# Patient Record
Sex: FEMALE | Race: WHITE
[De-identification: names, ages, dates, MRNs, and addresses within clinical notes are randomized per-mention and may not be internally consistent; named-entity substitution may affect disease eponyms.]

---

## 2019-01-24 ENCOUNTER — HOSPITAL ENCOUNTER (OUTPATIENT)
Dept: HOSPITAL 91 - OBT | Age: 44
Discharge: HOME | End: 2019-01-24
Payer: MEDICAID

## 2019-01-24 ENCOUNTER — HOSPITAL ENCOUNTER (OUTPATIENT)
Dept: HOSPITAL 10 - OBT | Age: 44
Discharge: HOME | End: 2019-01-24
Attending: OBSTETRICS & GYNECOLOGY
Payer: MEDICAID

## 2019-01-24 VITALS
WEIGHT: 158.51 LBS | BODY MASS INDEX: 29.93 KG/M2 | HEIGHT: 61 IN | HEIGHT: 61 IN | BODY MASS INDEX: 29.93 KG/M2 | WEIGHT: 158.51 LBS

## 2019-01-24 VITALS — DIASTOLIC BLOOD PRESSURE: 70 MMHG | SYSTOLIC BLOOD PRESSURE: 118 MMHG | HEART RATE: 64 BPM | RESPIRATION RATE: 18 BRPM

## 2019-01-24 DIAGNOSIS — Z3A.36: ICD-10-CM

## 2019-01-24 DIAGNOSIS — O24.410: Primary | ICD-10-CM

## 2019-01-24 DIAGNOSIS — O09.523: ICD-10-CM

## 2019-01-24 LAB
ADD UMIC: NO
UR ASCORBIC ACID: NEGATIVE MG/DL
UR BILIRUBIN (DIP): NEGATIVE MG/DL
UR BLOOD (DIP): NEGATIVE MG/DL
UR CLARITY: CLEAR
UR COLOR: YELLOW
UR GLUCOSE (DIP): NEGATIVE MG/DL
UR KETONES (DIP): NEGATIVE MG/DL
UR LEUKOCYTE ESTERASE (DIP): NEGATIVE LEU/UL
UR NITRITE (DIP): NEGATIVE MG/DL
UR PH (DIP): 6 (ref 5–9)
UR SPECIFIC GRAVITY (DIP): 1 (ref 1–1.03)
UR TOTAL PROTEIN (DIP): NEGATIVE MG/DL
UR UROBILINOGEN (DIP): NEGATIVE MG/DL

## 2019-01-24 PROCEDURE — 76818 FETAL BIOPHYS PROFILE W/NST: CPT

## 2019-01-24 PROCEDURE — G0463 HOSPITAL OUTPT CLINIC VISIT: HCPCS

## 2019-01-24 PROCEDURE — 82962 GLUCOSE BLOOD TEST: CPT

## 2019-01-24 PROCEDURE — 81003 URINALYSIS AUTO W/O SCOPE: CPT

## 2019-01-24 NOTE — PN
Triage Information


Date/Time





2019


Reason for visit:  Patient was sent from clinic for  testing due to GDM


Weeks of Gestation


36 weeks and 2 days


/Para


 6 para 4


Diabetes:  none


Diabetes management:  diet controlled


Hypertention:  none


Additional information


43-year-old  with IUP at 36 weeks and 2 days and GDM A1 diet-controlled was


sent to triage for  testing.  Patient denies any symptoms.  Denies 


leaking vaginal bleeding or contractions or decreased fetal movement.


Denies any urinary symptoms.





Objective





Vital Signs


  Date      Temp  Pulse  Resp  B/P (MAP)   Pulse Ox  O2          O2 Flow    FiO2


Time                                                 Delivery    Rate


   19  98.4     64    18      118/70            Room Air


     14:24                           (86)





Fetal Heart Rate:  130's


Fetal Heart Rate Comments


Category 1


Contractions:  6-10 Minutes Apart


Exam


                                        


PROCEDURE:   US Obstetrical, limited


 


CLINICAL INDICATION: PTL, cervical length


 


TECHNIQUE:   Multiple real-time  images were acquired of the patient's maternal 


abdomen utilizing a curved array transducer. 


 


Appearance: Alert and oriented x4 does not appear to be in any acute distress


Size consent with date


Abdomen soft, gravid


NST: Category 1


BPP: 8/8


Contraction noted 6-10 minutes but patient has not been feeling





Results/Medications


Results 24 hrs





Laboratory Tests


             Test
                     19
15:59  19
16:09


             Urine Color               YELLOW


             Urine Clarity             CLEAR


             Urine pH                          6.0


             Urine Specific Gravity          1.005


             Urine Ketones             NEGATIVE


             Urine Nitrite             NEGATIVE


             Urine Bilirubin           NEGATIVE


             Urine Urobilinogen        NEGATIVE


             Urine Leukocyte Esterase  NEGATIVE


             Urine Hemoglobin          NEGATIVE


             Urine Glucose             NEGATIVE


             Urine Total Protein       NEGATIVE


             Bedside Glucose                                  108





Imaging Results


PROCEDURE:   US OB. 


 


CLINICAL INDICATION:   Contractions 


 


TECHNIQUE:   Multiple sonographic images of the pelvis were obtained.  The 


images were reviewed on a PACS workstation. 


 


COMPARISON:   No prior studies are available for comparison. 


 


FINDINGS:


There is a single live intrauterine pregnancy.  Fetal cardiac activity is 


identified at a rate of 150 beats per minute.


Fetal presentation is cephalic. Placenta is anterior grade II. There is no e


vidence of placenta previa or abruption.


 


Biophysical profile score is as follows:


 


Breathing    2


Movements    2


Tone       2


Fluid volume    2


Amniotic fluid index = 12.1 cm


Total biophysical profile score = 8/8


 


IMPRESSION:


 


Biophysical profile score = 8/8


 


 


RPTAT: 


Disposition:  Discharge


Assessment/Plan


IUP at 36 weeks and 2 days


GDM A1 diet controlled


 testing reassuring





Continue  testing twice a week as recommended by her primary OB office


No evidence of  labor


 labor precautions and kick count discussed


Follow-up with GDM diet


Return to OB office within 48 hours after discharge from the hospital for 


arrangement for  testing discussed


Return to triage as needed











KASSI POLLOCK MD              2019 18:15

## 2019-01-24 NOTE — TRIAGE
===================================

OB Triage

===================================

Datetime Report Generated by CPN: 01/24/2019 17:01

   

   

===========================

Datetime: 01/24/2019 16:09

===========================

   

 Bedside Blood Glucose:  108

   

===========================

Datetime: 01/24/2019 15:31

===========================

   

   

===================================

Labor Evaluation

===================================

   

 Frequency:  x6

 Monitor Mode:  External

 Duration (sec)2399:  50-80

 Quality:  Mild

 Resting Tone [place]:  Relaxed

 Contraction Comments:  pt denies feeling UCs

   

===================================

Fetal Heart Rate

===================================

   

 FHR Baseline Rate:  135

 Monitor Mode:  External US

 FHR Baseline Changes:  No Baseline Change

 Variability:  Moderate 6-25 bpm

 Accelerations:  15X15

 Decelerations:  None

 Category:  Category I

   

===========================

Datetime: 01/24/2019 14:31

===========================

   

 Stage of Pregnancy:  OB Triage

 Assessment Type:  Triage

   

===================================

Maternal Assessment

===================================

   

 Level of Consciousness:  Fully Conscious

 DTR's/Clonus:  DTRs 2+; No Clonus

 Headache:  Denies

 Blurred Vision:  No

 Respiratory Effort:  Unlabored; Regular Rhythm; Equal Expansion

 Breath Sounds, Left:  Clear and Equal

 Breath Sounds, Right:  Clear and Equal

 Nausea/Vomiting:  Denies

 RUQ Epigastric Pain:  Denies

 Lower Extremities Edema:  None

     Degree:  None

 Upper Extremities Edema:  None

     Degree:  None

 Facial Edema:  None

 Temperature Route:  Oral

   

===================================

Fall Risk Assessment

===================================

   

 History of Falling:  (0) No

 Secondary Diagnosis:  (0) No

 Ambulatory Aid:  (0) Bedrest/Nurse Assist

 IV Therapy:  (0) No

 Gait:  (0) Normal/Bedrest/Immobile

 Mental Status:  (0) Oriented to Own Ability

 Fall Score:  0

 Fall Risk Score Definition:  No Risk: No action required

   

===================================

Labor Evaluation

===================================

   

 Frequency:  x1

 Monitor Mode:  External

 Duration (sec)2399:  50

 Quality:  Mild

 Resting Tone [place]:  Relaxed

 Contraction Comments:  pt denies feeling UCS

   

===================================

Fetal Heart Rate

===================================

   

 FHR Baseline Rate:  130

 Monitor Mode:  External US

 FHR Baseline Changes:  No Baseline Change

 Variability:  Moderate 6-25 bpm

 Accelerations:  15X15

 Decelerations:  None

 Category:  Category I

   

===================================

Pain Assessment

===================================

   

 Pain Scale:  0

 Pain Presence:  None/Denies

 Pain Type:  N/A

   

===========================

Datetime: 01/24/2019 14:30

===========================

   

 EGA:  36.2

   

===========================

Datetime: 01/24/2019 14:29

===========================

   

 Time of Arrival:  01/24/2019 14:01

 Arrived By:  Ambulatory

 Arrived From:  Home

 Chief Complaint:  NST, BPP

 Fetal Movement:  Present

 Contractions:  Denies/Absent

 Rupture of Membranes:  Denies

 Vaginal Bleeding:  None

 Vaginal Discharge:  Denies

 Recent Sexual Intercouse:  Denies

 Abdominal Trauma:  Not Applicable

 Patient Complaints:  None

 Time Provider Notified:  01/24/2019 15:35

 Provider Notified:  Dr Sullivan

 Initial Plan:  EFM, NST, BPP

## 2019-02-05 ENCOUNTER — HOSPITAL ENCOUNTER (OUTPATIENT)
Dept: HOSPITAL 91 - OBT | Age: 44
Discharge: HOME | End: 2019-02-05
Payer: MEDICAID

## 2019-02-05 DIAGNOSIS — O09.513: ICD-10-CM

## 2019-02-05 DIAGNOSIS — Z3A.38: ICD-10-CM

## 2019-02-05 DIAGNOSIS — O24.410: Primary | ICD-10-CM

## 2019-02-05 PROCEDURE — 76815 OB US LIMITED FETUS(S): CPT

## 2019-02-05 PROCEDURE — 76818 FETAL BIOPHYS PROFILE W/NST: CPT

## 2019-02-05 PROCEDURE — 82962 GLUCOSE BLOOD TEST: CPT

## 2019-02-12 ENCOUNTER — HOSPITAL ENCOUNTER (INPATIENT)
Dept: HOSPITAL 91 - L-D | Age: 44
LOS: 2 days | Discharge: HOME | End: 2019-02-14
Payer: MEDICAID

## 2019-02-12 DIAGNOSIS — Z3A.39: ICD-10-CM

## 2019-02-12 LAB
ADD MAN DIFF?: NO
BASOPHIL #: 0 10^3/UL (ref 0–0.1)
BASOPHILS %: 0.4 % (ref 0–2)
EOSINOPHILS #: 0 10^3/UL (ref 0–0.5)
EOSINOPHILS %: 0.2 % (ref 0–7)
GLUCOSE: 132 MG/DL (ref 70–220)
HEMATOCRIT: 38.3 % (ref 37–47)
HEMOGLOBIN: 12.9 G/DL (ref 12–16)
HEPATITIS B SURFACE ANTIGEN: NEGATIVE
IMMATURE GRANS #M: 0.03 10^3/UL (ref 0–0.03)
IMMATURE GRANS % (M): 0.4 % (ref 0–0.43)
INR: 0.88
LYMPHOCYTES #: 1.3 10^3/UL (ref 0.8–2.9)
LYMPHOCYTES %: 15.7 % (ref 15–51)
MEAN CORPUSCULAR HEMOGLOBIN: 30.1 PG (ref 29–33)
MEAN CORPUSCULAR HGB CONC: 33.7 G/DL (ref 32–37)
MEAN CORPUSCULAR VOLUME: 89.3 FL (ref 82–101)
MEAN PLATELET VOLUME: 11.9 FL (ref 7.4–10.4)
MONOCYTE #: 0.4 10^3/UL (ref 0.3–0.9)
MONOCYTES %: 5.2 % (ref 0–11)
NEUTROPHIL #: 6.3 10^3/UL (ref 1.6–7.5)
NEUTROPHILS %: 78.1 % (ref 39–77)
NUCLEATED RED BLOOD CELLS #: 0 10^3/UL (ref 0–0)
NUCLEATED RED BLOOD CELLS%: 0 /100WBC (ref 0–0)
PARTIAL THROMBOPLASTIN TIME: 29.9 SEC (ref 23–35)
PLATELET COUNT: 189 10^3/UL (ref 140–415)
PROTIME: 12 SEC (ref 11.9–14.9)
PT RATIO: 0.9
RAPID PLASMA REAGIN: NONREACTIVE
RED BLOOD COUNT: 4.29 10^6/UL (ref 4.2–5.4)
RED CELL DISTRIBUTION WIDTH: 14.3 % (ref 11.5–14.5)
WHITE BLOOD COUNT: 8 10^3/UL (ref 4.8–10.8)

## 2019-02-12 PROCEDURE — 86901 BLOOD TYPING SEROLOGIC RH(D): CPT

## 2019-02-12 PROCEDURE — 85025 COMPLETE CBC W/AUTO DIFF WBC: CPT

## 2019-02-12 PROCEDURE — 82947 ASSAY GLUCOSE BLOOD QUANT: CPT

## 2019-02-12 PROCEDURE — 85610 PROTHROMBIN TIME: CPT

## 2019-02-12 PROCEDURE — 86850 RBC ANTIBODY SCREEN: CPT

## 2019-02-12 PROCEDURE — 85730 THROMBOPLASTIN TIME PARTIAL: CPT

## 2019-02-12 PROCEDURE — 87340 HEPATITIS B SURFACE AG IA: CPT

## 2019-02-12 PROCEDURE — 86592 SYPHILIS TEST NON-TREP QUAL: CPT

## 2019-02-12 PROCEDURE — 82962 GLUCOSE BLOOD TEST: CPT

## 2019-02-12 PROCEDURE — 86900 BLOOD TYPING SEROLOGIC ABO: CPT

## 2019-02-12 PROCEDURE — 76815 OB US LIMITED FETUS(S): CPT

## 2019-02-12 RX ADMIN — Medication 1 MCG: at 11:05

## 2019-02-12 RX ADMIN — IBUPROFEN 1 MG: 600 TABLET ORAL at 15:43

## 2019-02-12 RX ADMIN — PYRIDOXINE HYDROCHLORIDE 1 MLS/HR: 100 INJECTION, SOLUTION INTRAMUSCULAR; INTRAVENOUS at 08:42

## 2019-02-12 RX ADMIN — Medication 1 MLS/HR: at 15:12

## 2019-02-12 RX ADMIN — Medication 1 MLS/HR: at 18:22

## 2019-02-12 RX ADMIN — Medication 1 MCG: at 13:00

## 2019-02-12 RX ADMIN — OXYCODONE HYDROCHLORIDE AND ASPIRIN 1 TAB: 4.8355; 325 TABLET ORAL at 19:55

## 2019-02-12 RX ADMIN — IBUPROFEN 1 MG: 600 TABLET ORAL at 23:34

## 2019-02-12 RX ADMIN — OXYTOCIN 1 MLS/HR: 10 INJECTION, SOLUTION INTRAMUSCULAR; INTRAVENOUS at 08:22

## 2019-02-12 RX ADMIN — DOCUSATE SODIUM AND SENNOSIDES 1 TAB: 8.6; 5 TABLET, FILM COATED ORAL at 21:04

## 2019-02-12 RX ADMIN — Medication 1 MLS/HR: at 14:42

## 2019-02-13 LAB
ADD MAN DIFF?: NO
BASOPHIL #: 0.1 10^3/UL (ref 0–0.1)
BASOPHILS %: 0.4 % (ref 0–2)
EOSINOPHILS #: 0.1 10^3/UL (ref 0–0.5)
EOSINOPHILS %: 0.7 % (ref 0–7)
HEMATOCRIT: 38.4 % (ref 37–47)
HEMOGLOBIN: 12.9 G/DL (ref 12–16)
IMMATURE GRANS #M: 0.05 10^3/UL (ref 0–0.03)
IMMATURE GRANS % (M): 0.4 % (ref 0–0.43)
LYMPHOCYTES #: 1.9 10^3/UL (ref 0.8–2.9)
LYMPHOCYTES %: 16.3 % (ref 15–51)
MEAN CORPUSCULAR HEMOGLOBIN: 30.2 PG (ref 29–33)
MEAN CORPUSCULAR HGB CONC: 33.6 G/DL (ref 32–37)
MEAN CORPUSCULAR VOLUME: 89.9 FL (ref 82–101)
MEAN PLATELET VOLUME: 11.6 FL (ref 7.4–10.4)
MONOCYTE #: 0.8 10^3/UL (ref 0.3–0.9)
MONOCYTES %: 6.9 % (ref 0–11)
NEUTROPHIL #: 8.6 10^3/UL (ref 1.6–7.5)
NEUTROPHILS %: 75.3 % (ref 39–77)
NUCLEATED RED BLOOD CELLS #: 0 10^3/UL (ref 0–0)
NUCLEATED RED BLOOD CELLS%: 0 /100WBC (ref 0–0)
PLATELET COUNT: 187 10^3/UL (ref 140–415)
RED BLOOD COUNT: 4.27 10^6/UL (ref 4.2–5.4)
RED CELL DISTRIBUTION WIDTH: 14.6 % (ref 11.5–14.5)
WHITE BLOOD COUNT: 11.4 10^3/UL (ref 4.8–10.8)

## 2019-02-13 RX ADMIN — IBUPROFEN 1 MG: 600 TABLET ORAL at 11:40

## 2019-02-13 RX ADMIN — IBUPROFEN 1 MG: 600 TABLET ORAL at 17:28

## 2019-02-13 RX ADMIN — IBUPROFEN 1 MG: 600 TABLET ORAL at 05:30

## 2019-02-13 RX ADMIN — DOCUSATE SODIUM AND SENNOSIDES 1 TAB: 8.6; 5 TABLET, FILM COATED ORAL at 09:30

## 2019-02-13 RX ADMIN — DOCUSATE SODIUM AND SENNOSIDES 1 TAB: 8.6; 5 TABLET, FILM COATED ORAL at 21:12

## 2019-02-14 RX ADMIN — IBUPROFEN 1 MG: 600 TABLET ORAL at 00:43

## 2019-02-14 RX ADMIN — DOCUSATE SODIUM AND SENNOSIDES 1 TAB: 8.6; 5 TABLET, FILM COATED ORAL at 08:39

## 2019-02-14 RX ADMIN — IBUPROFEN 1 MG: 600 TABLET ORAL at 11:42

## 2019-02-14 RX ADMIN — IBUPROFEN 1 MG: 600 TABLET ORAL at 05:40
